# Patient Record
Sex: MALE | Race: WHITE | Employment: OTHER | URBAN - METROPOLITAN AREA
[De-identification: names, ages, dates, MRNs, and addresses within clinical notes are randomized per-mention and may not be internally consistent; named-entity substitution may affect disease eponyms.]

---

## 2017-06-06 ENCOUNTER — HOSPITAL ENCOUNTER (OUTPATIENT)
Dept: PREADMISSION TESTING | Age: 32
Discharge: HOME | End: 2017-06-06
Payer: MEDICARE

## 2017-06-06 VITALS
SYSTOLIC BLOOD PRESSURE: 136 MMHG | OXYGEN SATURATION: 96 % | DIASTOLIC BLOOD PRESSURE: 78 MMHG | WEIGHT: 226 LBS | TEMPERATURE: 97.5 F | HEIGHT: 74 IN | RESPIRATION RATE: 18 BRPM | BODY MASS INDEX: 29 KG/M2 | HEART RATE: 86 BPM

## 2017-06-06 LAB
ABSOLUTE EOS #: 0 K/UL (ref 0–0.4)
ABSOLUTE LYMPH #: 2.2 K/UL (ref 1–4.8)
ABSOLUTE MONO #: 0.5 K/UL (ref 0.1–1.3)
BASOPHILS # BLD: 1 %
BASOPHILS ABSOLUTE: 0 K/UL (ref 0–0.2)
DIFFERENTIAL TYPE: NORMAL
EOSINOPHILS RELATIVE PERCENT: 1 %
HCT VFR BLD CALC: 46.6 % (ref 41–53)
HEMOGLOBIN: 15.5 G/DL (ref 13.5–17.5)
LYMPHOCYTES # BLD: 44 %
MCH RBC QN AUTO: 30.2 PG (ref 26–34)
MCHC RBC AUTO-ENTMCNC: 33.3 G/DL (ref 31–37)
MCV RBC AUTO: 90.9 FL (ref 80–100)
MONOCYTES # BLD: 9 %
PDW BLD-RTO: 13.1 % (ref 11.5–14.9)
PLATELET # BLD: 204 K/UL (ref 150–450)
PLATELET ESTIMATE: NORMAL
PMV BLD AUTO: 7.2 FL (ref 6–12)
RBC # BLD: 5.13 M/UL (ref 4.5–5.9)
RBC # BLD: NORMAL 10*6/UL
SEG NEUTROPHILS: 45 %
SEGMENTED NEUTROPHILS ABSOLUTE COUNT: 2.2 K/UL (ref 1.3–9.1)
WBC # BLD: 4.9 K/UL (ref 3.5–11)
WBC # BLD: NORMAL 10*3/UL

## 2017-06-06 PROCEDURE — 36415 COLL VENOUS BLD VENIPUNCTURE: CPT

## 2017-06-06 PROCEDURE — 85025 COMPLETE CBC W/AUTO DIFF WBC: CPT

## 2017-06-06 RX ORDER — FELBAMATE 400 MG/1
800 TABLET ORAL 2 TIMES DAILY
COMMUNITY

## 2017-06-06 RX ORDER — DIVALPROEX SODIUM 250 MG/1
750 TABLET, DELAYED RELEASE ORAL 2 TIMES DAILY
COMMUNITY

## 2017-06-06 RX ORDER — LEVOCARNITINE 330 MG/1
330 TABLET ORAL 2 TIMES DAILY
COMMUNITY

## 2017-06-10 ENCOUNTER — ANESTHESIA EVENT (OUTPATIENT)
Dept: OPERATING ROOM | Age: 32
End: 2017-06-10
Payer: MEDICARE

## 2017-06-10 RX ORDER — LIDOCAINE HYDROCHLORIDE 10 MG/ML
1 INJECTION, SOLUTION EPIDURAL; INFILTRATION; INTRACAUDAL; PERINEURAL
Status: CANCELLED | OUTPATIENT
Start: 2017-06-10 | End: 2017-06-10

## 2017-06-10 RX ORDER — SODIUM CHLORIDE, SODIUM LACTATE, POTASSIUM CHLORIDE, CALCIUM CHLORIDE 600; 310; 30; 20 MG/100ML; MG/100ML; MG/100ML; MG/100ML
INJECTION, SOLUTION INTRAVENOUS CONTINUOUS
Status: CANCELLED | OUTPATIENT
Start: 2017-06-10

## 2017-06-10 RX ORDER — SODIUM CHLORIDE 0.9 % (FLUSH) 0.9 %
10 SYRINGE (ML) INJECTION EVERY 12 HOURS SCHEDULED
Status: CANCELLED | OUTPATIENT
Start: 2017-06-10

## 2017-06-10 RX ORDER — SODIUM CHLORIDE 0.9 % (FLUSH) 0.9 %
10 SYRINGE (ML) INJECTION PRN
Status: CANCELLED | OUTPATIENT
Start: 2017-06-10

## 2017-06-14 ENCOUNTER — HOSPITAL ENCOUNTER (OUTPATIENT)
Age: 32
Setting detail: OUTPATIENT SURGERY
Discharge: HOME | End: 2017-06-14
Attending: ORAL & MAXILLOFACIAL SURGERY | Admitting: ORAL & MAXILLOFACIAL SURGERY
Payer: MEDICARE

## 2017-06-14 ENCOUNTER — ANESTHESIA (OUTPATIENT)
Dept: OPERATING ROOM | Age: 32
End: 2017-06-14
Payer: MEDICARE

## 2017-06-14 VITALS
WEIGHT: 226 LBS | HEART RATE: 64 BPM | TEMPERATURE: 97.2 F | HEIGHT: 74 IN | BODY MASS INDEX: 29 KG/M2 | RESPIRATION RATE: 16 BRPM | OXYGEN SATURATION: 97 % | DIASTOLIC BLOOD PRESSURE: 78 MMHG | SYSTOLIC BLOOD PRESSURE: 133 MMHG

## 2017-06-14 VITALS — DIASTOLIC BLOOD PRESSURE: 87 MMHG | SYSTOLIC BLOOD PRESSURE: 151 MMHG | TEMPERATURE: 94.6 F | OXYGEN SATURATION: 97 %

## 2017-06-14 PROCEDURE — 6370000000 HC RX 637 (ALT 250 FOR IP): Performed by: ANESTHESIOLOGY

## 2017-06-14 PROCEDURE — 3600000002 HC SURGERY LEVEL 2 BASE: Performed by: ORAL & MAXILLOFACIAL SURGERY

## 2017-06-14 PROCEDURE — 3700000001 HC ADD 15 MINUTES (ANESTHESIA): Performed by: ORAL & MAXILLOFACIAL SURGERY

## 2017-06-14 PROCEDURE — 2580000003 HC RX 258: Performed by: ANESTHESIOLOGY

## 2017-06-14 PROCEDURE — 3600000012 HC SURGERY LEVEL 2 ADDTL 15MIN: Performed by: ORAL & MAXILLOFACIAL SURGERY

## 2017-06-14 PROCEDURE — 6360000002 HC RX W HCPCS: Performed by: ANESTHESIOLOGY

## 2017-06-14 PROCEDURE — 7100000000 HC PACU RECOVERY - FIRST 15 MIN: Performed by: ORAL & MAXILLOFACIAL SURGERY

## 2017-06-14 PROCEDURE — 6360000002 HC RX W HCPCS: Performed by: ORAL & MAXILLOFACIAL SURGERY

## 2017-06-14 PROCEDURE — 7100000031 HC ASPR PHASE II RECOVERY - ADDTL 15 MIN: Performed by: ORAL & MAXILLOFACIAL SURGERY

## 2017-06-14 PROCEDURE — 2500000003 HC RX 250 WO HCPCS: Performed by: ORAL & MAXILLOFACIAL SURGERY

## 2017-06-14 PROCEDURE — 7100000030 HC ASPR PHASE II RECOVERY - FIRST 15 MIN: Performed by: ORAL & MAXILLOFACIAL SURGERY

## 2017-06-14 PROCEDURE — 2500000003 HC RX 250 WO HCPCS: Performed by: ANESTHESIOLOGY

## 2017-06-14 PROCEDURE — 3700000000 HC ANESTHESIA ATTENDED CARE: Performed by: ORAL & MAXILLOFACIAL SURGERY

## 2017-06-14 PROCEDURE — 7100000001 HC PACU RECOVERY - ADDTL 15 MIN: Performed by: ORAL & MAXILLOFACIAL SURGERY

## 2017-06-14 RX ORDER — LABETALOL HYDROCHLORIDE 5 MG/ML
5 INJECTION, SOLUTION INTRAVENOUS EVERY 10 MIN PRN
Status: DISCONTINUED | OUTPATIENT
Start: 2017-06-14 | End: 2017-06-14 | Stop reason: HOSPADM

## 2017-06-14 RX ORDER — HYDROCODONE BITARTRATE AND ACETAMINOPHEN 5; 325 MG/1; MG/1
1 TABLET ORAL EVERY 6 HOURS PRN
Qty: 20 TABLET | Refills: 0
Start: 2017-06-14 | End: 2017-06-21

## 2017-06-14 RX ORDER — NEOSTIGMINE METHYLSULFATE 1 MG/ML
INJECTION, SOLUTION INTRAVENOUS PRN
Status: DISCONTINUED | OUTPATIENT
Start: 2017-06-14 | End: 2017-06-14 | Stop reason: SDUPTHER

## 2017-06-14 RX ORDER — PROMETHAZINE HYDROCHLORIDE 25 MG/ML
6.25 INJECTION, SOLUTION INTRAMUSCULAR; INTRAVENOUS
Status: DISCONTINUED | OUTPATIENT
Start: 2017-06-14 | End: 2017-06-14 | Stop reason: HOSPADM

## 2017-06-14 RX ORDER — LIDOCAINE HYDROCHLORIDE AND EPINEPHRINE BITARTRATE 20; .01 MG/ML; MG/ML
INJECTION, SOLUTION SUBCUTANEOUS PRN
Status: DISCONTINUED | OUTPATIENT
Start: 2017-06-14 | End: 2017-06-14 | Stop reason: HOSPADM

## 2017-06-14 RX ORDER — MIDAZOLAM HYDROCHLORIDE 1 MG/ML
2 INJECTION INTRAMUSCULAR; INTRAVENOUS ONCE
Status: COMPLETED | OUTPATIENT
Start: 2017-06-14 | End: 2017-06-14

## 2017-06-14 RX ORDER — OXYCODONE HYDROCHLORIDE AND ACETAMINOPHEN 5; 325 MG/1; MG/1
2 TABLET ORAL PRN
Status: DISCONTINUED | OUTPATIENT
Start: 2017-06-14 | End: 2017-06-14 | Stop reason: HOSPADM

## 2017-06-14 RX ORDER — LIDOCAINE HYDROCHLORIDE 10 MG/ML
INJECTION, SOLUTION EPIDURAL; INFILTRATION; INTRACAUDAL; PERINEURAL PRN
Status: DISCONTINUED | OUTPATIENT
Start: 2017-06-14 | End: 2017-06-14 | Stop reason: SDUPTHER

## 2017-06-14 RX ORDER — SODIUM CHLORIDE 0.9 % (FLUSH) 0.9 %
10 SYRINGE (ML) INJECTION PRN
Status: DISCONTINUED | OUTPATIENT
Start: 2017-06-14 | End: 2017-06-14 | Stop reason: HOSPADM

## 2017-06-14 RX ORDER — BUPIVACAINE HYDROCHLORIDE AND EPINEPHRINE 5; 5 MG/ML; UG/ML
INJECTION, SOLUTION EPIDURAL; INTRACAUDAL; PERINEURAL PRN
Status: DISCONTINUED | OUTPATIENT
Start: 2017-06-14 | End: 2017-06-14 | Stop reason: HOSPADM

## 2017-06-14 RX ORDER — SODIUM CHLORIDE, SODIUM LACTATE, POTASSIUM CHLORIDE, CALCIUM CHLORIDE 600; 310; 30; 20 MG/100ML; MG/100ML; MG/100ML; MG/100ML
INJECTION, SOLUTION INTRAVENOUS CONTINUOUS
Status: DISCONTINUED | OUTPATIENT
Start: 2017-06-14 | End: 2017-06-14 | Stop reason: HOSPADM

## 2017-06-14 RX ORDER — IBUPROFEN 800 MG/1
800 TABLET ORAL EVERY 8 HOURS PRN
Qty: 20 TABLET | Refills: 0
Start: 2017-06-14

## 2017-06-14 RX ORDER — OXYCODONE HYDROCHLORIDE AND ACETAMINOPHEN 5; 325 MG/1; MG/1
1 TABLET ORAL PRN
Status: DISCONTINUED | OUTPATIENT
Start: 2017-06-14 | End: 2017-06-14 | Stop reason: HOSPADM

## 2017-06-14 RX ORDER — LIDOCAINE HYDROCHLORIDE 10 MG/ML
1 INJECTION, SOLUTION EPIDURAL; INFILTRATION; INTRACAUDAL; PERINEURAL
Status: DISCONTINUED | OUTPATIENT
Start: 2017-06-14 | End: 2017-06-14 | Stop reason: HOSPADM

## 2017-06-14 RX ORDER — AMOXICILLIN 250 MG/1
500 CAPSULE ORAL 3 TIMES DAILY
Qty: 15 CAPSULE | Refills: 0 | Status: SHIPPED | OUTPATIENT
Start: 2017-06-14 | End: 2017-06-19

## 2017-06-14 RX ORDER — PROPOFOL 10 MG/ML
INJECTION, EMULSION INTRAVENOUS PRN
Status: DISCONTINUED | OUTPATIENT
Start: 2017-06-14 | End: 2017-06-14 | Stop reason: SDUPTHER

## 2017-06-14 RX ORDER — ROCURONIUM BROMIDE 10 MG/ML
INJECTION, SOLUTION INTRAVENOUS PRN
Status: DISCONTINUED | OUTPATIENT
Start: 2017-06-14 | End: 2017-06-14 | Stop reason: SDUPTHER

## 2017-06-14 RX ORDER — ONDANSETRON 2 MG/ML
INJECTION INTRAMUSCULAR; INTRAVENOUS PRN
Status: DISCONTINUED | OUTPATIENT
Start: 2017-06-14 | End: 2017-06-14 | Stop reason: SDUPTHER

## 2017-06-14 RX ORDER — SODIUM CHLORIDE, SODIUM LACTATE, POTASSIUM CHLORIDE, CALCIUM CHLORIDE 600; 310; 30; 20 MG/100ML; MG/100ML; MG/100ML; MG/100ML
INJECTION, SOLUTION INTRAVENOUS CONTINUOUS PRN
Status: DISCONTINUED | OUTPATIENT
Start: 2017-06-14 | End: 2017-06-14 | Stop reason: SDUPTHER

## 2017-06-14 RX ORDER — DIPHENHYDRAMINE HYDROCHLORIDE 50 MG/ML
12.5 INJECTION INTRAMUSCULAR; INTRAVENOUS
Status: DISCONTINUED | OUTPATIENT
Start: 2017-06-14 | End: 2017-06-14 | Stop reason: HOSPADM

## 2017-06-14 RX ORDER — FENTANYL CITRATE 50 UG/ML
INJECTION, SOLUTION INTRAMUSCULAR; INTRAVENOUS PRN
Status: DISCONTINUED | OUTPATIENT
Start: 2017-06-14 | End: 2017-06-14 | Stop reason: SDUPTHER

## 2017-06-14 RX ORDER — GLYCOPYRROLATE 0.2 MG/ML
INJECTION INTRAMUSCULAR; INTRAVENOUS PRN
Status: DISCONTINUED | OUTPATIENT
Start: 2017-06-14 | End: 2017-06-14 | Stop reason: SDUPTHER

## 2017-06-14 RX ORDER — MEPERIDINE HYDROCHLORIDE 25 MG/ML
12.5 INJECTION INTRAMUSCULAR; INTRAVENOUS; SUBCUTANEOUS EVERY 5 MIN PRN
Status: DISCONTINUED | OUTPATIENT
Start: 2017-06-14 | End: 2017-06-14 | Stop reason: HOSPADM

## 2017-06-14 RX ORDER — SODIUM CHLORIDE 0.9 % (FLUSH) 0.9 %
10 SYRINGE (ML) INJECTION EVERY 12 HOURS SCHEDULED
Status: DISCONTINUED | OUTPATIENT
Start: 2017-06-14 | End: 2017-06-14 | Stop reason: HOSPADM

## 2017-06-14 RX ORDER — SCOLOPAMINE TRANSDERMAL SYSTEM 1 MG/1
1 PATCH, EXTENDED RELEASE TRANSDERMAL ONCE
Status: DISCONTINUED | OUTPATIENT
Start: 2017-06-14 | End: 2017-06-14 | Stop reason: HOSPADM

## 2017-06-14 RX ADMIN — NEOSTIGMINE METHYLSULFATE 3 MG: 1 INJECTION, SOLUTION INTRAVENOUS at 14:36

## 2017-06-14 RX ADMIN — ONDANSETRON 4 MG: 2 INJECTION, SOLUTION INTRAMUSCULAR; INTRAVENOUS at 14:13

## 2017-06-14 RX ADMIN — PROPOFOL 200 MG: 10 INJECTION, EMULSION INTRAVENOUS at 13:43

## 2017-06-14 RX ADMIN — FENTANYL CITRATE 100 MCG: 50 INJECTION, SOLUTION INTRAMUSCULAR; INTRAVENOUS at 13:42

## 2017-06-14 RX ADMIN — ROCURONIUM BROMIDE 50 MG: 10 INJECTION INTRAVENOUS at 13:43

## 2017-06-14 RX ADMIN — SODIUM CHLORIDE, POTASSIUM CHLORIDE, SODIUM LACTATE AND CALCIUM CHLORIDE: 600; 310; 30; 20 INJECTION, SOLUTION INTRAVENOUS at 15:21

## 2017-06-14 RX ADMIN — GLYCOPYRROLATE 0.4 MG: 0.2 INJECTION, SOLUTION INTRAMUSCULAR; INTRAVENOUS at 14:36

## 2017-06-14 RX ADMIN — MIDAZOLAM 2 MG: 1 INJECTION INTRAMUSCULAR; INTRAVENOUS at 11:46

## 2017-06-14 RX ADMIN — LIDOCAINE HYDROCHLORIDE 5 ML: 10 INJECTION, SOLUTION EPIDURAL; INFILTRATION; INTRACAUDAL; PERINEURAL at 13:43

## 2017-06-14 RX ADMIN — Medication 2 G: at 13:48

## 2017-06-14 RX ADMIN — SODIUM CHLORIDE, POTASSIUM CHLORIDE, SODIUM LACTATE AND CALCIUM CHLORIDE: 600; 310; 30; 20 INJECTION, SOLUTION INTRAVENOUS at 13:38

## 2017-06-14 RX ADMIN — SODIUM CHLORIDE, POTASSIUM CHLORIDE, SODIUM LACTATE AND CALCIUM CHLORIDE: 600; 310; 30; 20 INJECTION, SOLUTION INTRAVENOUS at 11:41

## 2017-06-14 ASSESSMENT — PAIN SCALES - GENERAL: PAINLEVEL_OUTOF10: 0

## 2017-06-14 ASSESSMENT — PAIN - FUNCTIONAL ASSESSMENT: PAIN_FUNCTIONAL_ASSESSMENT: 0-10

## 2025-02-13 PROBLEM — G40.909 NONINTRACTABLE EPILEPSY WITHOUT STATUS EPILEPTICUS (HCC): Status: ACTIVE | Noted: 2020-08-04

## 2025-02-13 PROBLEM — G40.812 NONINTRACTABLE LENNOX-GASTAUT SYNDROME WITHOUT STATUS EPILEPTICUS (HCC): Status: ACTIVE | Noted: 2020-11-17

## 2025-02-13 PROBLEM — S93.491A SPRAIN OF ANTERIOR TALOFIBULAR LIGAMENT OF RIGHT ANKLE: Status: ACTIVE | Noted: 2022-07-13

## 2025-02-13 PROBLEM — F79 INTELLECTUAL DISABILITY: Status: ACTIVE | Noted: 2020-08-04

## 2025-02-13 PROBLEM — R25.1 TREMOR: Status: ACTIVE | Noted: 2020-08-04

## 2025-03-26 ENCOUNTER — OFFICE VISIT (OUTPATIENT)
Dept: PRIMARY CARE CLINIC | Age: 40
End: 2025-03-26
Payer: COMMERCIAL

## 2025-03-26 VITALS
OXYGEN SATURATION: 97 % | DIASTOLIC BLOOD PRESSURE: 76 MMHG | SYSTOLIC BLOOD PRESSURE: 128 MMHG | BODY MASS INDEX: 30.84 KG/M2 | HEART RATE: 86 BPM | HEIGHT: 71 IN | WEIGHT: 220.3 LBS

## 2025-03-26 DIAGNOSIS — G40.909 NONINTRACTABLE EPILEPSY WITHOUT STATUS EPILEPTICUS, UNSPECIFIED EPILEPSY TYPE (HCC): ICD-10-CM

## 2025-03-26 DIAGNOSIS — K59.00 CONSTIPATION, UNSPECIFIED CONSTIPATION TYPE: Primary | ICD-10-CM

## 2025-03-26 DIAGNOSIS — Z76.89 ENCOUNTER TO ESTABLISH CARE: ICD-10-CM

## 2025-03-26 DIAGNOSIS — F79 INTELLECTUAL DISABILITY: ICD-10-CM

## 2025-03-26 DIAGNOSIS — G40.812 NONINTRACTABLE LENNOX-GASTAUT SYNDROME WITHOUT STATUS EPILEPTICUS (HCC): ICD-10-CM

## 2025-03-26 PROCEDURE — G8417 CALC BMI ABV UP PARAM F/U: HCPCS | Performed by: NURSE PRACTITIONER

## 2025-03-26 PROCEDURE — 99204 OFFICE O/P NEW MOD 45 MIN: CPT | Performed by: NURSE PRACTITIONER

## 2025-03-26 PROCEDURE — G8427 DOCREV CUR MEDS BY ELIG CLIN: HCPCS | Performed by: NURSE PRACTITIONER

## 2025-03-26 PROCEDURE — 1036F TOBACCO NON-USER: CPT | Performed by: NURSE PRACTITIONER

## 2025-03-26 RX ORDER — POLYETHYLENE GLYCOL 3350 17 G/17G
17 POWDER, FOR SOLUTION ORAL DAILY
Qty: 510 G | Refills: 11 | Status: SHIPPED | OUTPATIENT
Start: 2025-03-26

## 2025-03-26 SDOH — ECONOMIC STABILITY: FOOD INSECURITY: WITHIN THE PAST 12 MONTHS, THE FOOD YOU BOUGHT JUST DIDN'T LAST AND YOU DIDN'T HAVE MONEY TO GET MORE.: NEVER TRUE

## 2025-03-26 SDOH — ECONOMIC STABILITY: FOOD INSECURITY: WITHIN THE PAST 12 MONTHS, YOU WORRIED THAT YOUR FOOD WOULD RUN OUT BEFORE YOU GOT MONEY TO BUY MORE.: NEVER TRUE

## 2025-03-26 ASSESSMENT — ENCOUNTER SYMPTOMS
RHINORRHEA: 0
SORE THROAT: 0
NAUSEA: 0
BACK PAIN: 0
WHEEZING: 0
COUGH: 0
DIARRHEA: 0
ABDOMINAL PAIN: 0
SHORTNESS OF BREATH: 0
EYE DISCHARGE: 0
EYE REDNESS: 0
VOMITING: 0
CONSTIPATION: 1

## 2025-03-26 ASSESSMENT — PATIENT HEALTH QUESTIONNAIRE - PHQ9: DEPRESSION UNABLE TO ASSESS: FUNCTIONAL CAPACITY MOTIVATION LIMITS ACCURACY

## 2025-03-26 NOTE — PROGRESS NOTES
swelling.   Gastrointestinal:  Positive for constipation. Negative for abdominal pain, diarrhea, nausea and vomiting.   Genitourinary:  Negative for dysuria and frequency.   Musculoskeletal:  Negative for arthralgias, back pain and myalgias.   Skin:  Negative for rash and wound.   Neurological:  Positive for tremors. Negative for dizziness, light-headedness and headaches.   Psychiatric/Behavioral:  Negative for dysphoric mood and sleep disturbance. The patient is nervous/anxious (in social settings).        Objective:     /76   Pulse 86   Ht 1.803 m (5' 11\")   Wt 99.9 kg (220 lb 4.8 oz)   SpO2 97%   BMI 30.73 kg/m²   Physical Exam  Vitals and nursing note reviewed.   Constitutional:       General: He is not in acute distress.     Appearance: He is well-developed. He is not ill-appearing.   HENT:      Head: Normocephalic and atraumatic.      Right Ear: Tympanic membrane, ear canal and external ear normal.      Left Ear: Tympanic membrane, ear canal and external ear normal.      Mouth/Throat:      Mouth: Mucous membranes are moist.      Pharynx: Oropharynx is clear.   Eyes:      General: No scleral icterus.        Right eye: No discharge.         Left eye: No discharge.      Conjunctiva/sclera: Conjunctivae normal.   Neck:      Thyroid: No thyromegaly.      Trachea: No tracheal deviation.   Cardiovascular:      Rate and Rhythm: Normal rate and regular rhythm.      Pulses:           Dorsalis pedis pulses are 2+ on the right side and 2+ on the left side.        Posterior tibial pulses are 0 on the right side and 0 on the left side.      Heart sounds: Normal heart sounds.   Pulmonary:      Effort: Pulmonary effort is normal. No respiratory distress.      Breath sounds: Normal breath sounds. No wheezing.   Abdominal:      General: Bowel sounds are normal.      Palpations: Abdomen is soft.   Musculoskeletal:      Right lower leg: No edema.      Left lower leg: No edema.   Feet:      Right foot:      Skin

## (undated) DEVICE — INTENDED FOR TISSUE SEPARATION, AND OTHER PROCEDURES THAT REQUIRE A SHARP SURGICAL BLADE TO PUNCTURE OR CUT.: Brand: BARD-PARKER ® CARBON RIB-BACK BLADES

## (undated) DEVICE — COVER LT HNDL BLU PLAS

## (undated) DEVICE — FRAZIER SUCTION INSTRUMENT 12 FR W/CONTROL VENT & OBTURATOR: Brand: FRAZIER

## (undated) DEVICE — SOLUTION IV IRRIG POUR BRL 0.9% SODIUM CHL 2F7124

## (undated) DEVICE — SURGICAL PROCEDURE PACK PLAS C

## (undated) DEVICE — TUBE ET DIA7.5MM ORAL NSL CUF MURPHY EYE HI LO RADPQ LN

## (undated) DEVICE — SYRINGE BLB 2 PC STER 50

## (undated) DEVICE — AGENT HEMSTAT W2XL14IN OXIDIZED REGENERATED CELOS ABSRB FOR

## (undated) DEVICE — MEDI-VAC YANKAUER SUCTION HANDLE W/BULBOUS TIP: Brand: CARDINAL HEALTH

## (undated) DEVICE — MEDI-VAC NON-CONDUCTIVE SUCTION TUBING: Brand: CARDINAL HEALTH

## (undated) DEVICE — GAUZE,SPONGE,4"X4",16PLY,XRAY,STRL,LF: Brand: MEDLINE

## (undated) DEVICE — STERILE LATEX POWDER-FREE SURGICAL GLOVESWITH NITRILE COATING: Brand: PROTEXIS

## (undated) DEVICE — CONVERTED USE 248065 TOWELS OR BL ST

## (undated) DEVICE — SUTURE CHROMIC GUT SZ 3-0 L27IN ABSRB BRN L19MM FS-2 3/8 636H

## (undated) DEVICE — PENCIL ES L10FT COAT BLDE HOLSTER

## (undated) DEVICE — 1810 FOAM BLOCK NEEDLE COUNTER: Brand: DEVON

## (undated) DEVICE — NEEDLE HYPO 22GA L1.5IN BLK POLYPR HUB S STL SHT BVL STR

## (undated) DEVICE — 1200CC GUARDIAN II: Brand: GUARDIAN

## (undated) DEVICE — CARBIDE BUR, SIDE CUTTING, 6 FLUTES, MED., 2MM DIA.: Brand: MICROAIRE®

## (undated) DEVICE — Device: Brand: LEVEL 1